# Patient Record
Sex: FEMALE | Race: WHITE | URBAN - METROPOLITAN AREA
[De-identification: names, ages, dates, MRNs, and addresses within clinical notes are randomized per-mention and may not be internally consistent; named-entity substitution may affect disease eponyms.]

---

## 2018-05-29 ENCOUNTER — OFFICE VISIT (OUTPATIENT)
Dept: FAMILY MEDICINE | Facility: CLINIC | Age: 33
End: 2018-05-29
Payer: COMMERCIAL

## 2018-05-29 ENCOUNTER — RADIANT APPOINTMENT (OUTPATIENT)
Dept: GENERAL RADIOLOGY | Facility: CLINIC | Age: 33
End: 2018-05-29
Attending: FAMILY MEDICINE
Payer: COMMERCIAL

## 2018-05-29 VITALS
BODY MASS INDEX: 32.3 KG/M2 | SYSTOLIC BLOOD PRESSURE: 110 MMHG | RESPIRATION RATE: 16 BRPM | DIASTOLIC BLOOD PRESSURE: 60 MMHG | OXYGEN SATURATION: 97 % | HEIGHT: 63 IN | HEART RATE: 68 BPM | WEIGHT: 182.3 LBS

## 2018-05-29 DIAGNOSIS — M25.511 ACUTE PAIN OF RIGHT SHOULDER: ICD-10-CM

## 2018-05-29 DIAGNOSIS — M25.511 ACUTE PAIN OF RIGHT SHOULDER: Primary | ICD-10-CM

## 2018-05-29 PROCEDURE — 99214 OFFICE O/P EST MOD 30 MIN: CPT | Performed by: FAMILY MEDICINE

## 2018-05-29 PROCEDURE — 73030 X-RAY EXAM OF SHOULDER: CPT | Mod: RT

## 2018-05-29 NOTE — NURSING NOTE
"Chief Complaint   Patient presents with     Shoulder Pain       Initial /60 (BP Location: Right arm, Patient Position: Chair, Cuff Size: Adult Large)  Pulse 68  Resp 16  Ht 5' 3\" (1.6 m)  Wt 182 lb 4.8 oz (82.7 kg)  LMP 05/22/2018 (Exact Date)  SpO2 97%  Breastfeeding? No  BMI 32.29 kg/m2 Estimated body mass index is 32.29 kg/(m^2) as calculated from the following:    Height as of this encounter: 5' 3\" (1.6 m).    Weight as of this encounter: 182 lb 4.8 oz (82.7 kg).  Medication Reconciliation: complete      Health Maintenance addressed:  Pap Smear 2 yrs ago [facility]  and tetanus    N/a    NYDIA Marie        "

## 2018-05-29 NOTE — PROGRESS NOTES
"  SUBJECTIVE:   Rama Lucero is a 32 year old female who presents to clinic today for the following health issues:      Joint Pain    Onset: 2 nights ago     Description:   Location: right shoulder  Character: Sharp and Dull ache    Intensity: moderate, severe    Progression of Symptoms: same    Accompanying Signs & Symptoms:  Other symptoms: none    History:   Previous similar pain: YES      Precipitating factors:   Trauma or overuse: YES- 10+ yrs ago     Alleviating factors:  Improved by: nothing    Therapies Tried and outcome: IBU      Was sleeping 2 nights ago, felt her right shoulder pop out.  popped back in. Since then, she has had pain in the shoulder, abduction mostly.     Has occurred in the past, twice when she was a , carrying trays with her right arm. Typically pain subsides once the humeral head is reduced.      No n/t down the arm.         Problem list and histories reviewed & adjusted, as indicated.  Additional history: none    There is no problem list on file for this patient.    No past surgical history on file.    Social History   Substance Use Topics     Smoking status: Never Smoker     Smokeless tobacco: Never Used     Alcohol use 0.0 oz/week     0 Standard drinks or equivalent per week     No family history on file.        Reviewed and updated as needed this visit by clinical staff  Tobacco  Allergies  Meds  Soc Hx      Reviewed and updated as needed this visit by Provider         ROS:  Constitutional, HEENT, cardiovascular, pulmonary, gi and gu systems are negative, except as otherwise noted.    OBJECTIVE:     /60 (BP Location: Right arm, Patient Position: Chair, Cuff Size: Adult Large)  Pulse 68  Resp 16  Ht 5' 3\" (1.6 m)  Wt 182 lb 4.8 oz (82.7 kg)  LMP 05/22/2018 (Exact Date)  SpO2 97%  Breastfeeding? No  BMI 32.29 kg/m2  Body mass index is 32.29 kg/(m^2).  GENERAL: healthy, alert and no distress  MS: negative sulcus sign, pain with abduction and external " rotation, full ROM    Diagnostic Test Results:  Xray - right shoulder, negative    ASSESSMENT/PLAN:     1. Acute pain of right shoulder - discussed negative images today, advised monitor - if pain not improving or if dislocates again, suggested orthopedics consultation  - XR Shoulder Right G/E 3 Views; Future    Latoya Cheung MD  [facility]

## 2018-05-29 NOTE — MR AVS SNAPSHOT
"              After Visit Summary   2018    Rama Lucero    MRN: [id number]           Patient Information     Date Of Birth          1985        Visit Information        Provider Department      2018 2:40 PM Latoya Cheung MD [facility]        Today's Diagnoses     Acute pain of right shoulder    -  1       Follow-ups after your visit        Who to contact     If you have questions or need follow up information about today's clinic visit or your schedule please contact [facility] directly at [phone].  Normal or non-critical lab and imaging results will be communicated to you by GreenFuelhart, letter or phone within 4 business days after the clinic has received the results. If you do not hear from us within 7 days, please contact the clinic through GreenFuelhart or phone. If you have a critical or abnormal lab result, we will notify you by phone as soon as possible.  Submit refill requests through dVisit or call your pharmacy and they will forward the refill request to us. Please allow 3 business days for your refill to be completed.          Additional Information About Your Visit        MyChart Information     dVisit lets you send messages to your doctor, view your test results, renew your prescriptions, schedule appointments and more. To sign up, go to [email or web address] . Click on \"Log in\" on the left side of the screen, which will take you to the Welcome page. Then click on \"Sign up Now\" on the right side of the page.     You will be asked to enter the access code listed below, as well as some personal information. Please follow the directions to create your username and password.     Your access code is: [email or web address]  Expires: 2018  4:24 PM     Your access code will  in 90 days. If you need help or a new code, please call your [facility] or [phone].        Care EveryWhere ID     This is your Care EveryWhere ID. This could be used by " "other organizations to access your [place] medical records  [id number]        Your Vitals Were     Pulse Respirations Height Last Period Pulse Oximetry Breastfeeding?    68 16 5' 3\" (1.6 m) 05/22/2018 (Exact Date) 97% No    BMI (Body Mass Index)                   32.29 kg/m2            Blood Pressure from Last 3 Encounters:   05/29/18 110/60   06/09/16 (!) 82/53    Weight from Last 3 Encounters:   05/29/18 182 lb 4.8 oz (82.7 kg)   06/09/16 168 lb 1.6 oz (76.2 kg)               Primary Care Provider Fax #    Physician No Ref-Primary [phone]       No address on file        Equal Access to Services     OWEN CROWELL : Ridge Ferguson, wajennifer car, qatamar kaalmada adedarvinyada, phuc stephen . So [facility] [phone].    ATENCIÓN: Si habla español, tiene a harris disposición servicios gratuitos de asistencia lingüística. Llame al [phone].    We comply with applicable federal civil rights laws and [place] laws. We do not discriminate on the basis of race, color, national origin, age, disability, sex, sexual orientation, or gender identity.            Thank you!     Thank you for choosing [facility]  for your care. Our goal is always to provide you with excellent care. Hearing back from our patients is one way we can continue to improve our services. Please take a few minutes to complete the written survey that you may receive in the mail after your visit with us. Thank you!             Your Updated Medication List - Protect others around you: Learn how to safely use, store and throw away your medicines at www.disposemymeds.org.          This list is accurate as of 5/29/18  4:24 PM.  Always use your most recent med list.                   Brand Name Dispense Instructions for use Diagnosis    ADVAIR DISKUS IN           ALBUTEROL IN           CLARITIN PO           PRENATAL MULTIVITAMIN/IRON PO           ZOLOFT PO      Take by mouth daily          "

## 2018-09-10 ENCOUNTER — TELEPHONE (OUTPATIENT)
Dept: FAMILY MEDICINE | Facility: CLINIC | Age: 33
End: 2018-09-10

## 2018-09-10 NOTE — TELEPHONE ENCOUNTER
Panel Management Review      Patient has the following on her problem list: None      Composite cancer screening  Chart review shows that this patient is due/due soon for the following Pap Smear  Summary:    Patient is due/failing the following:   PAP    Action needed:   Patient needs office visit for pap.    Type of outreach:    Phone, left message for patient to call back.     Questions for provider review:    None                                                                                                                                    NYDIA Marie       Chart routed to  .

## 2018-09-10 NOTE — LETTER
September 25, 2018      Rama Lucero  [address] [place] [id number]        Dear Rama,     Our records indicate that you may be due for preventative health care services.  Please make an appointment or call to set up the following tests as recommended.  If you have already had this testing done at another clinic please contact us to help us update our records to reflect the preventative care that you have had.    Cervical cancer screen (pap smear) - Recommended at least every 3 years for women 21 and older.  A Pap test is used to detect cervical cancer.  The test should be taken at least once every three years but women who are at a greater risk for cervical cancer may need to have the test more often.  You may be at a greater risk for cervical cancer if: (1) You have had a sexually transmitted disease, (2) You have had more than one sex partner, (3) You have had an abnormal pap test in the past.                                                                                                                                                Thank you for choosing [facility]. We appreciate the opportunity to serve you and look forward to supporting your healthcare needs in the future.    If you have any questions or concerns, please contact us at [phone]          Sincerely,        [facility]

## 2019-01-29 ENCOUNTER — TELEPHONE (OUTPATIENT)
Dept: FAMILY MEDICINE | Facility: CLINIC | Age: 34
End: 2019-01-29

## 2019-01-29 NOTE — TELEPHONE ENCOUNTER
Panel Management Review      Patient has the following on her problem list: None      Composite cancer screening  Chart review shows that this patient is due/due soon for the following Pap Smear  Summary:    Patient is due/failing the following:   PAP    Action needed:   Patient needs office visit for physical pap.    Type of outreach:    Phone, left message for patient to call back.     Questions for provider review:    None                                                                                                                                    Chidi Carreon [facility]           Chart routed to Care Team .

## 2019-01-29 NOTE — LETTER
Rama Lucero  [address] [place] [id number]      Dear Rama,    Our records indicate that you may be due for preventative health care services.  Please make an appointment or call to set up the following tests as recommended.  If you have already had this testing done at another clinic please contact us to help us update our records to reflect the preventative care that you have had.    Cervical cancer screen (pap smear) - Recommended at least every 3 years for women 21 and older.  A Pap test is used to detect cervical cancer.  The test should be taken at least once every three years but women who are at a greater risk for cervical cancer may need to have the test more often.  You may be at a greater risk for cervical cancer if: (1) You have had a sexually transmitted disease, (2) You have had more than one sex partner, (3) You have had an abnormal pap test in the past.                                                                                                                                                Thank you for choosing [facility]. We appreciate the opportunity to serve you and look forward to supporting your healthcare needs in the future.    If you have any questions or concerns, please contact us at [phone]      Sincerely,       Ramona Aaseby-Aguilera PA-C/Anais Breen

## 2025-02-04 ENCOUNTER — ANCILLARY PROCEDURE (OUTPATIENT)
Dept: GENERAL RADIOLOGY | Facility: CLINIC | Age: 40
End: 2025-02-04
Attending: FAMILY MEDICINE
Payer: COMMERCIAL

## 2025-02-04 ENCOUNTER — OFFICE VISIT (OUTPATIENT)
Dept: URGENT CARE | Facility: URGENT CARE | Age: 40
End: 2025-02-04
Payer: COMMERCIAL

## 2025-02-04 VITALS
TEMPERATURE: 98.5 F | DIASTOLIC BLOOD PRESSURE: 84 MMHG | BODY MASS INDEX: 31.89 KG/M2 | OXYGEN SATURATION: 99 % | WEIGHT: 180 LBS | HEART RATE: 62 BPM | SYSTOLIC BLOOD PRESSURE: 136 MMHG | RESPIRATION RATE: 20 BRPM

## 2025-02-04 DIAGNOSIS — J45.901 EXACERBATION OF ASTHMA, UNSPECIFIED ASTHMA SEVERITY, UNSPECIFIED WHETHER PERSISTENT: ICD-10-CM

## 2025-02-04 DIAGNOSIS — R05.8 PRODUCTIVE COUGH: Primary | ICD-10-CM

## 2025-02-04 PROCEDURE — 99204 OFFICE O/P NEW MOD 45 MIN: CPT | Performed by: FAMILY MEDICINE

## 2025-02-04 PROCEDURE — 71046 X-RAY EXAM CHEST 2 VIEWS: CPT | Mod: TC | Performed by: RADIOLOGY

## 2025-02-04 RX ORDER — BUPROPION HYDROCHLORIDE 150 MG/1
150 TABLET ORAL DAILY
COMMUNITY

## 2025-02-04 RX ORDER — PREDNISONE 20 MG/1
20 TABLET ORAL 2 TIMES DAILY
Qty: 10 TABLET | Refills: 0 | Status: SHIPPED | OUTPATIENT
Start: 2025-02-04 | End: 2025-02-09

## 2025-02-04 RX ORDER — ALBUTEROL SULFATE 90 UG/1
INHALANT RESPIRATORY (INHALATION)
COMMUNITY
Start: 2024-12-11

## 2025-02-04 RX ORDER — AZITHROMYCIN 250 MG/1
TABLET, FILM COATED ORAL
Qty: 6 TABLET | Refills: 0 | Status: SHIPPED | OUTPATIENT
Start: 2025-02-04 | End: 2025-02-09

## 2025-02-04 NOTE — PROGRESS NOTES
SUBJECTIVE: Rama Lucero is a 39 year old female presenting with a chief complaint of cough .  Onset of symptoms was 10 day(s) ago.  Course of illness is worsening.    Predisposing factors include HX of asthma.    No past medical history on file.  Allergies   Allergen Reactions    No Clinical Screening - See Comments      Other reaction(s): Itching  Pollens, grass, weeds, trees, cats, and dust.     Social History     Tobacco Use    Smoking status: Never    Smokeless tobacco: Never   Substance Use Topics    Alcohol use: Yes     Alcohol/week: 0.0 standard drinks of alcohol       ROS:  SKIN: no rash  GI: no vomiting    OBJECTIVE:  /84 (BP Location: Right arm, Patient Position: Sitting, Cuff Size: Adult Regular)   Pulse 62   Temp 98.5  F (36.9  C) (Tympanic)   Resp 20   Wt 81.6 kg (180 lb)   SpO2 99%   BMI 31.89 kg/m  GENERAL APPEARANCE: healthy, alert and no distress  EYES: EOMI,  PERRL, conjunctiva clear  HENT: ear canals and TM's normal.  Nose and mouth without ulcers, erythema or lesions  RESP: expiratory wheezes throughout  SKIN: no suspicious lesions or rashes    Xray without acute findings, no pneumonia read by Abraham Simons D.O.      ICD-10-CM    1. Productive cough  R05.8 XR Chest 2 Views     azithromycin (ZITHROMAX) 250 MG tablet      2. Exacerbation of asthma, unspecified asthma severity, unspecified whether persistent  J45.901 XR Chest 2 Views     predniSONE (DELTASONE) 20 MG tablet        Cont inhaler  Fluids/Rest, f/u if worse/not any better